# Patient Record
Sex: MALE | Race: WHITE | NOT HISPANIC OR LATINO | Employment: OTHER | URBAN - METROPOLITAN AREA
[De-identification: names, ages, dates, MRNs, and addresses within clinical notes are randomized per-mention and may not be internally consistent; named-entity substitution may affect disease eponyms.]

---

## 2017-05-25 ENCOUNTER — HOSPITAL ENCOUNTER (EMERGENCY)
Facility: OTHER | Age: 67
Discharge: HOME | End: 2017-05-25
Attending: EMERGENCY MEDICINE
Payer: MEDICARE

## 2017-05-25 VITALS
TEMPERATURE: 98 F | WEIGHT: 165 LBS | DIASTOLIC BLOOD PRESSURE: 96 MMHG | SYSTOLIC BLOOD PRESSURE: 154 MMHG | OXYGEN SATURATION: 96 % | RESPIRATION RATE: 17 BRPM | BODY MASS INDEX: 22.35 KG/M2 | HEIGHT: 72 IN | HEART RATE: 58 BPM

## 2017-05-25 DIAGNOSIS — I10 HYPERTENSION: ICD-10-CM

## 2017-05-25 DIAGNOSIS — I10 POORLY-CONTROLLED HYPERTENSION: Primary | ICD-10-CM

## 2017-05-25 LAB
ANION GAP SERPL CALC-SCNC: 13 MMOL/L
BUN SERPL-MCNC: 16 MG/DL
CALCIUM SERPL-MCNC: 9.9 MG/DL
CHLORIDE SERPL-SCNC: 105 MMOL/L
CO2 SERPL-SCNC: 26 MMOL/L
CREAT SERPL-MCNC: 1.1 MG/DL
EST. GFR  (AFRICAN AMERICAN): >60 ML/MIN/1.73 M^2
EST. GFR  (NON AFRICAN AMERICAN): >60 ML/MIN/1.73 M^2
GLUCOSE SERPL-MCNC: 91 MG/DL
POTASSIUM SERPL-SCNC: 3 MMOL/L
SODIUM SERPL-SCNC: 144 MMOL/L

## 2017-05-25 PROCEDURE — 93010 ELECTROCARDIOGRAM REPORT: CPT | Mod: ,,, | Performed by: INTERNAL MEDICINE

## 2017-05-25 PROCEDURE — 93005 ELECTROCARDIOGRAM TRACING: CPT

## 2017-05-25 PROCEDURE — 25000003 PHARM REV CODE 250: Performed by: EMERGENCY MEDICINE

## 2017-05-25 PROCEDURE — 80048 BASIC METABOLIC PNL TOTAL CA: CPT

## 2017-05-25 PROCEDURE — 99284 EMERGENCY DEPT VISIT MOD MDM: CPT | Mod: 25

## 2017-05-25 RX ORDER — LOSARTAN POTASSIUM 50 MG/1
50 TABLET ORAL 2 TIMES DAILY
Qty: 60 TABLET | Refills: 1 | Status: SHIPPED | OUTPATIENT
Start: 2017-05-25

## 2017-05-25 RX ORDER — GABAPENTIN 300 MG/1
300 CAPSULE ORAL 3 TIMES DAILY
COMMUNITY

## 2017-05-25 RX ORDER — DILTIAZEM HYDROCHLORIDE 180 MG/1
180 CAPSULE, COATED, EXTENDED RELEASE ORAL DAILY
COMMUNITY

## 2017-05-25 RX ORDER — OMEPRAZOLE 20 MG/1
20 TABLET, DELAYED RELEASE ORAL DAILY
COMMUNITY

## 2017-05-25 RX ORDER — HYDROCHLOROTHIAZIDE 25 MG/1
25 TABLET ORAL DAILY
COMMUNITY

## 2017-05-25 RX ORDER — LOSARTAN POTASSIUM 50 MG/1
50 TABLET ORAL DAILY
Status: DISCONTINUED | OUTPATIENT
Start: 2017-05-25 | End: 2017-05-25 | Stop reason: HOSPADM

## 2017-05-25 RX ORDER — POTASSIUM CHLORIDE 750 MG/1
10 TABLET, EXTENDED RELEASE ORAL DAILY
COMMUNITY

## 2017-05-25 RX ORDER — FLUTICASONE PROPIONATE 50 MCG
1 SPRAY, SUSPENSION (ML) NASAL DAILY
COMMUNITY

## 2017-05-25 RX ORDER — DOXYCYCLINE 100 MG/1
100 CAPSULE ORAL EVERY 12 HOURS
COMMUNITY

## 2017-05-25 RX ORDER — LOSARTAN POTASSIUM 50 MG/1
50 TABLET ORAL DAILY
COMMUNITY
End: 2017-05-25

## 2017-05-25 RX ORDER — PROPAFENONE HYDROCHLORIDE 225 MG/1
225 TABLET, FILM COATED ORAL EVERY 8 HOURS
COMMUNITY

## 2017-05-25 RX ORDER — ROSUVASTATIN CALCIUM 40 MG/1
10 TABLET, COATED ORAL NIGHTLY
COMMUNITY

## 2017-05-25 RX ADMIN — LOSARTAN POTASSIUM 50 MG: 50 TABLET, FILM COATED ORAL at 10:05

## 2017-05-25 NOTE — ED TRIAGE NOTES
Pt reports to ED c/o hypertension. Pt sent over by Dr. Alfaro for SBP in 200s, scheduled to have skin cancer removed today. Pt in [facility] from [place] for surgery, pt reports BP of 200/113 on Friday at PCP in . Pt compliant with all medications including losartan and hctz. Pt reports episode of migraine on Saturday with photophobia, blurred vision that has since resolved. Denies lightheadedness/dizziness, numbness/tingling/swelling to extremities, fevers, chills, chest pain, SOB, N/V/D. AAO x 3.

## 2017-05-25 NOTE — ED PROVIDER NOTES
"Encounter Date: 5/25/2017    SCRIBE #1 NOTE: I, Nayely eRd, am scribing for, and in the presence of, Dr. Ulloa.       History     Chief Complaint   Patient presents with    Hypertension     Sent from clinic before eye procedure w/ reports of hypertension SBP in 200's ( 215/115, 201/112, 208/112) , " My pressure was also high of Friday when I saw my PCP). Pt reports tkaing all prescribed Bp medications this morning.      Review of patient's allergies indicates:  No Known Allergies  Time seen by provider: 9:51 AM    This is a 66 y.o. male who presents with complaint of elevated BP that has persisted for the past few days.  The patient endorses no acute symptoms, including SOB, CP, palpitations, leg swelling, abdominal pain, nausea, vomiting, dizziness, lightheadedness, facial asymmetry, speech difficulty, or HA.  As per his daughter, the patient had a HA with blurry vision and dizziness 2 days ago, which he attributed to his migraines and has since resolved.  He reports no identifying, alleviating, or exacerbating factors.  He admits to history of heart murmur, intermittent atrial fibrillation, HTN, migraines, and basal cell carcinoma of the right eyelid.  The patient reports that he was scheduled to have surgery today to excise a cancerous lesion from the right eyelid and perform a reconstruction.  Due to his elevated BP, the surgery has been postponed indefinitely.  He was first alerted to his elevated BP 6 days ago, but he was told that the measurement was likely incorrect due to machine error.  The patient admits that he has not been consistently measuring his BP due to family stressors.  However, he states that he is complaint with his medication regimen and took his morning doses of medication today.  He states that he is followed by a PCP in , Dr. Mackenzie.      The history is provided by the patient.     Past Medical History:   Diagnosis Date    Cancer     skin cancer to right eye    Hypertension     " Migraine headache      Past Surgical History:   Procedure Laterality Date    CAROTID ARTERY ANGIOPLASTY      PROSTATECTOMY       History reviewed. No pertinent family history.  Social History   Substance Use Topics    Smoking status: Former Smoker    Smokeless tobacco: Not on file    Alcohol use Yes      Comment: socially     Review of Systems   Constitutional: Negative for chills and fever.   HENT: Negative for congestion and facial swelling.    Respiratory: Negative for chest tightness and shortness of breath.    Cardiovascular: Negative for chest pain, palpitations and leg swelling.        Positive for elevated BP.   Gastrointestinal: Negative for abdominal pain, nausea and vomiting.   Endocrine: Negative for polyuria.   Genitourinary: Negative for dysuria.   Musculoskeletal: Negative for myalgias.   Skin: Negative for rash.   Neurological: Negative for dizziness, facial asymmetry, speech difficulty, light-headedness and headaches.       Physical Exam     Initial Vitals [05/25/17 0906]   BP Pulse Resp Temp SpO2   (!) 216/99 62 16 97.5 °F (36.4 °C) 99 %     Physical Exam    Nursing note and vitals reviewed.  Constitutional: He appears well-developed and well-nourished. He is not diaphoretic. No distress.   HENT:   Head: Normocephalic and atraumatic.   Right Ear: External ear normal.   Left Ear: External ear normal.   Eyes: EOM are normal. Right eye exhibits no discharge. Left eye exhibits no discharge.   Neck: Normal range of motion.   Cardiovascular: Normal rate and regular rhythm. Exam reveals no gallop and no friction rub.    Murmur heard.   Systolic murmur is present with a grade of 3/6   3/6 systolic murmur, best heard at the left upper border.   Pulmonary/Chest: Breath sounds normal. No respiratory distress. He has no wheezes. He has no rhonchi. He has no rales.   Abdominal: Soft. There is no tenderness. There is no rebound and no guarding.   Musculoskeletal: Normal range of motion. He exhibits no edema  or tenderness.   Neurological: He is alert and oriented to person, place, and time.   Skin: Skin is warm and dry. No rash and no abscess noted. No erythema. No pallor.   Psychiatric: He has a normal mood and affect. His behavior is normal. Judgment and thought content normal.         ED Course   Procedures  Labs Reviewed   BASIC METABOLIC PANEL - Abnormal; Notable for the following:        Result Value    Potassium 3.0 (*)     All other components within normal limits      Imaging Results    None         EKG Readings: (Independently Interpreted)   Initial Reading: No STEMI.   Sinus rhythm.  Rate of 67. Normal VA and QT intervals. Nonspecific ST changes.  No ischemia or arrhythmia.  No prior EKG for comparison.           Medical Decision Making:   Independently Interpreted Test(s):   I have ordered and independently interpreted EKG Reading(s) - see prior notes  Clinical Tests:   Lab Tests: Reviewed and Ordered  Medical Tests: Reviewed and Ordered            Scribe Attestation:   Scribe #1: I performed the above scribed service and the documentation accurately describes the services I performed. I attest to the accuracy of the note.    Attending Attestation:           Physician Attestation for Scribe:  Physician Attestation Statement for Scribe #1: I, Dr. Ulloa, reviewed documentation, as scribed by Nayely Red in my presence, and it is both accurate and complete.                 ED Course     Patient presents referred from the ophthalmology after they had to cancel scheduled surgery today due to elevated blood pressure.  He is compliant with his medications including this morning.  He does report that a week ago at a different physician visit his blood pressure was noted to be similarly high.  He does not have any chest pain shortness breath or other symptoms to suggest a hypertensive emergency.  Well-appearing.  We do not have baseline labs on him I therefore obtained a chemistry panel.  Antihypertensives  medications reviewed.  Will increase his Cozaar.  From 50 daily to twice a day.  Given first dose now.  Blood pressure is improving during observation remains asymptomatic advise check blood pressure daily, keep a log follow-up with primary care physician in approximately 1 week for reevaluation    Clinical Impression:     1. Poorly-controlled hypertension    2. Hypertension              Cesar Ulloa II, MD  05/26/17 3633

## 2017-05-25 NOTE — ED NOTES
Pt ambulated to restroom with slow and steady gait. Denies lightheadedness/dizziness. VSS, even, unlabored respirations, bed in lowest, locked position, side rails up x 2. Call light within reach.